# Patient Record
Sex: FEMALE
[De-identification: names, ages, dates, MRNs, and addresses within clinical notes are randomized per-mention and may not be internally consistent; named-entity substitution may affect disease eponyms.]

---

## 2024-09-09 ENCOUNTER — APPOINTMENT (OUTPATIENT)
Facility: CLINIC | Age: 89
End: 2024-09-09

## 2024-09-09 VITALS — SYSTOLIC BLOOD PRESSURE: 127 MMHG | HEART RATE: 86 BPM | DIASTOLIC BLOOD PRESSURE: 79 MMHG

## 2024-09-09 DIAGNOSIS — Z78.9 OTHER SPECIFIED HEALTH STATUS: ICD-10-CM

## 2024-09-09 DIAGNOSIS — Z87.898 PERSONAL HISTORY OF OTHER SPECIFIED CONDITIONS: ICD-10-CM

## 2024-09-09 DIAGNOSIS — Z95.810 PRESENCE OF AUTOMATIC (IMPLANTABLE) CARDIAC DEFIBRILLATOR: ICD-10-CM

## 2024-09-09 DIAGNOSIS — I50.22 CHRONIC SYSTOLIC (CONGESTIVE) HEART FAILURE: ICD-10-CM

## 2024-09-09 DIAGNOSIS — R29.6 REPEATED FALLS: ICD-10-CM

## 2024-09-09 DIAGNOSIS — I34.0 NONRHEUMATIC MITRAL (VALVE) INSUFFICIENCY: ICD-10-CM

## 2024-09-09 DIAGNOSIS — I48.0 PAROXYSMAL ATRIAL FIBRILLATION: ICD-10-CM

## 2024-09-09 DIAGNOSIS — Z45.018 ENCOUNTER FOR ADJUSTMENT AND MANAGEMENT OF OTHER PART OF CARDIAC PACEMAKER: ICD-10-CM

## 2024-09-09 PROBLEM — Z00.00 ENCOUNTER FOR PREVENTIVE HEALTH EXAMINATION: Status: ACTIVE | Noted: 2024-09-09

## 2024-09-09 PROCEDURE — 99204 OFFICE O/P NEW MOD 45 MIN: CPT

## 2024-09-11 PROBLEM — I50.22 HEART FAILURE, LEFT SYSTOLIC, CHRONIC: Status: ACTIVE | Noted: 2024-09-09

## 2024-09-11 PROBLEM — Z95.810 CARDIAC DEFIBRILLATOR IN PLACE: Status: ACTIVE | Noted: 2024-09-09

## 2024-09-11 PROBLEM — I48.0 PAROXYSMAL ATRIAL FIBRILLATION: Status: ACTIVE | Noted: 2024-09-09

## 2024-09-11 NOTE — DISCUSSION/SUMMARY
[FreeTextEntry1] : 89 yo F with history of HTN, moderate MR, chronic systolic HF with most recent EF 45% on 7/7/2024 (previously normal in July 2023) s/p DC ICD placed in 2013 in [place] ([facility]), paroxysmal AF on Eliquis 2.5mg bid whose device is close to [id number] MAUDE. Device interrogation was performed today with normal device function, DDD 70 with underlying sinus bradycardia in 40-50s. Patient has been in AF since May/June 2024 and is currently in AF based on device interrogation (see scanned). We discussed ICD generator change including risks of bleeding, infection, pain control. Patient usually get her device interrogation done [place] the downtown location and already has an appointment there for generator change in November 2024. They will consider get it done sooner at [facility]. We will contact them to schedule.   Regarding her atrial fibrillation [id number], she is not currently on anticoagulation due to recurrent falls and risks of bleeding outweighing the benefits. Would avoid antiarrhythmics without use of anticoagulation. She is tolerating the AF and given her age and frail status, will leave her in AF with rate control for now.   Schedule follow up in 2 weeks post generator replacement if patient decides to come for procedure at [facility].

## 2024-09-11 NOTE — HISTORY OF PRESENT ILLNESS
[FreeTextEntry1] : 89 yo F with history of chronic systolic HF with most recent EF 45%, pAF not on ac due to falls, HTN, moderate MR s/p dual chamber ICD [facility] 2013 placed in [place]. Patient was referred today for device interrogation with concern that it has reached MAUDE.

## 2024-09-11 NOTE — PHYSICAL EXAM
[Frail] : frail [Normal S1, S2] : normal S1, S2 [Clear Lung Fields] : clear lung fields [Soft] : abdomen soft [Non Tender] : non-tender [No Rash] : no rash [Moves all extremities] : moves all extremities [No Focal Deficits] : no focal deficits [Normal Speech] : normal speech [de-identified] : in wheelchair [de-identified] : irregularly irregular [de-identified] : 2+ bilateral pitting edema

## 2024-09-11 NOTE — PHYSICAL EXAM
[Frail] : frail [Normal S1, S2] : normal S1, S2 [Clear Lung Fields] : clear lung fields [Soft] : abdomen soft [Non Tender] : non-tender [No Rash] : no rash [Moves all extremities] : moves all extremities [No Focal Deficits] : no focal deficits [Normal Speech] : normal speech [de-identified] : in wheelchair [de-identified] : irregularly irregular [de-identified] : 2+ bilateral pitting edema

## 2024-09-11 NOTE — REVIEW OF SYSTEMS
[Lower Ext Edema] : lower extremity edema [Negative] : Constitutional [Fever] : no fever [Chills] : no chills [SOB] : no shortness of breath [Chest Discomfort] : no chest discomfort [Palpitations] : no palpitations [Syncope] : no syncope [Cough] : no cough [Rash] : no rash [Dizziness] : no dizziness

## 2024-09-11 NOTE — HISTORY OF PRESENT ILLNESS
[FreeTextEntry1] : 89 yo F with history of chronic systolic HF with most recent EF 45%, pAF not on ac due to falls, HTN, moderate MR s/p dual chamber ICD [facility] 2013 placed in [place]. Patient was referred today for device interrogation with concern that it has reached MAUDE.

## 2024-09-11 NOTE — DISCUSSION/SUMMARY
[FreeTextEntry1] : 89 yo F with history of HTN, moderate MR, chronic systolic HF with most recent EF 45% on 7/7/2024 (previously normal in July 2023) s/p DC ICD placed in 2013 in [place] ([facility]), paroxysmal AF on Eliquis 2.5mg bid whose device is close to [id number] MAUDE. Device interrogation was performed today with normal device function, DDD 70 with underlying sinus bradycardia in 40-50s. Patient has been in AF since May/June 2024 and is currently in AF based on device interrogation (see scanned). We discussed ICD generator change including risks of bleeding, infection, pain control. Patient usually get her device interrogation done at the downtown location and already has an appointment there for generator change in November 2024. They will consider get it done sooner at [facility]. We will contact them to schedule.   Regarding her atrial fibrillation [id number], she is not currently on anticoagulation due to recurrent falls and risks of bleeding outweighing the benefits. Would avoid antiarrhythmics without use of anticoagulation. She is tolerating the AF and given her age and frail status, will leave her in AF with rate control for now.   Schedule follow up in 2 weeks post generator replacement if patient decides to come for procedure at [facility].